# Patient Record
Sex: MALE | Race: WHITE | URBAN - METROPOLITAN AREA
[De-identification: names, ages, dates, MRNs, and addresses within clinical notes are randomized per-mention and may not be internally consistent; named-entity substitution may affect disease eponyms.]

---

## 2017-12-13 ENCOUNTER — HOSPITAL ENCOUNTER (OUTPATIENT)
Dept: SURGERY | Age: 22
Discharge: HOME | End: 2017-12-13
Attending: ANESTHESIOLOGY | Admitting: ANESTHESIOLOGY

## 2017-12-13 VITALS
DIASTOLIC BLOOD PRESSURE: 60 MMHG | SYSTOLIC BLOOD PRESSURE: 114 MMHG | HEART RATE: 99 BPM | OXYGEN SATURATION: 99 % | TEMPERATURE: 98 F | RESPIRATION RATE: 16 BRPM

## 2017-12-13 LAB
GLUCOSE BLD-MCNC: 75 MG/DL (ref 70–99)
GLUCOSE BLD-MCNC: 81 MG/DL (ref 70–99)
PERFORMED ON: NORMAL
PERFORMED ON: NORMAL

## 2017-12-13 RX ORDER — LIDOCAINE HYDROCHLORIDE 10 MG/ML
1 INJECTION, SOLUTION EPIDURAL; INFILTRATION; INTRACAUDAL; PERINEURAL
Status: COMPLETED | OUTPATIENT
Start: 2017-12-13 | End: 2017-12-13

## 2017-12-13 RX ORDER — ONDANSETRON 2 MG/ML
4 INJECTION INTRAMUSCULAR; INTRAVENOUS
Status: ACTIVE | OUTPATIENT
Start: 2017-12-13 | End: 2017-12-13

## 2017-12-13 RX ORDER — HYDRALAZINE HYDROCHLORIDE 20 MG/ML
5 INJECTION INTRAMUSCULAR; INTRAVENOUS
Status: DISCONTINUED | OUTPATIENT
Start: 2017-12-13 | End: 2017-12-14 | Stop reason: HOSPADM

## 2017-12-13 RX ORDER — MEPERIDINE HYDROCHLORIDE 25 MG/ML
12.5 INJECTION INTRAMUSCULAR; INTRAVENOUS; SUBCUTANEOUS EVERY 5 MIN PRN
Status: DISCONTINUED | OUTPATIENT
Start: 2017-12-13 | End: 2017-12-14 | Stop reason: HOSPADM

## 2017-12-13 RX ORDER — SODIUM CHLORIDE 0.9 % (FLUSH) 0.9 %
10 SYRINGE (ML) INJECTION EVERY 12 HOURS SCHEDULED
Status: DISCONTINUED | OUTPATIENT
Start: 2017-12-13 | End: 2017-12-14 | Stop reason: HOSPADM

## 2017-12-13 RX ORDER — MORPHINE SULFATE 10 MG/ML
1 INJECTION, SOLUTION INTRAMUSCULAR; INTRAVENOUS EVERY 5 MIN PRN
Status: DISCONTINUED | OUTPATIENT
Start: 2017-12-13 | End: 2017-12-14 | Stop reason: HOSPADM

## 2017-12-13 RX ORDER — HYDROMORPHONE HCL 110MG/55ML
0.25 PATIENT CONTROLLED ANALGESIA SYRINGE INTRAVENOUS EVERY 5 MIN PRN
Status: DISCONTINUED | OUTPATIENT
Start: 2017-12-13 | End: 2017-12-14 | Stop reason: HOSPADM

## 2017-12-13 RX ORDER — OXYCODONE HYDROCHLORIDE AND ACETAMINOPHEN 5; 325 MG/1; MG/1
2 TABLET ORAL PRN
Status: COMPLETED | OUTPATIENT
Start: 2017-12-13 | End: 2017-12-13

## 2017-12-13 RX ORDER — MORPHINE SULFATE 10 MG/ML
2 INJECTION, SOLUTION INTRAMUSCULAR; INTRAVENOUS EVERY 5 MIN PRN
Status: DISCONTINUED | OUTPATIENT
Start: 2017-12-13 | End: 2017-12-14 | Stop reason: HOSPADM

## 2017-12-13 RX ORDER — OXYCODONE HYDROCHLORIDE AND ACETAMINOPHEN 5; 325 MG/1; MG/1
1 TABLET ORAL PRN
Status: COMPLETED | OUTPATIENT
Start: 2017-12-13 | End: 2017-12-13

## 2017-12-13 RX ORDER — OXYCODONE HYDROCHLORIDE 10 MG/1
10-20 TABLET ORAL EVERY 4 HOURS PRN
Qty: 60 TABLET | Refills: 0 | Status: SHIPPED | OUTPATIENT
Start: 2017-12-13 | End: 2017-12-20

## 2017-12-13 RX ORDER — SODIUM CHLORIDE 0.9 % (FLUSH) 0.9 %
10 SYRINGE (ML) INJECTION PRN
Status: DISCONTINUED | OUTPATIENT
Start: 2017-12-13 | End: 2017-12-14 | Stop reason: HOSPADM

## 2017-12-13 RX ORDER — HYDROMORPHONE HCL 110MG/55ML
0.5 PATIENT CONTROLLED ANALGESIA SYRINGE INTRAVENOUS EVERY 5 MIN PRN
Status: DISCONTINUED | OUTPATIENT
Start: 2017-12-13 | End: 2017-12-14 | Stop reason: HOSPADM

## 2017-12-13 RX ORDER — LABETALOL HYDROCHLORIDE 5 MG/ML
5 INJECTION, SOLUTION INTRAVENOUS EVERY 10 MIN PRN
Status: DISCONTINUED | OUTPATIENT
Start: 2017-12-13 | End: 2017-12-14 | Stop reason: HOSPADM

## 2017-12-13 RX ORDER — SODIUM CHLORIDE, SODIUM LACTATE, POTASSIUM CHLORIDE, CALCIUM CHLORIDE 600; 310; 30; 20 MG/100ML; MG/100ML; MG/100ML; MG/100ML
INJECTION, SOLUTION INTRAVENOUS CONTINUOUS
Status: DISCONTINUED | OUTPATIENT
Start: 2017-12-13 | End: 2017-12-14 | Stop reason: HOSPADM

## 2017-12-13 RX ADMIN — SODIUM CHLORIDE, SODIUM LACTATE, POTASSIUM CHLORIDE, CALCIUM CHLORIDE: 600; 310; 30; 20 INJECTION, SOLUTION INTRAVENOUS at 06:29

## 2017-12-13 RX ADMIN — LIDOCAINE HYDROCHLORIDE 0.1 ML: 10 INJECTION, SOLUTION EPIDURAL; INFILTRATION; INTRACAUDAL; PERINEURAL at 06:28

## 2017-12-13 RX ADMIN — OXYCODONE HYDROCHLORIDE AND ACETAMINOPHEN 1 TABLET: 5; 325 TABLET ORAL at 09:02

## 2017-12-13 RX ADMIN — Medication 0.5 MG: at 09:01

## 2017-12-13 ASSESSMENT — PAIN SCALES - GENERAL
PAINLEVEL_OUTOF10: 7
PAINLEVEL_OUTOF10: 0
PAINLEVEL_OUTOF10: 7
PAINLEVEL_OUTOF10: 7
PAINLEVEL_OUTOF10: 0
PAINLEVEL_OUTOF10: 0

## 2017-12-13 ASSESSMENT — PAIN - FUNCTIONAL ASSESSMENT: PAIN_FUNCTIONAL_ASSESSMENT: 0-10

## 2017-12-13 ASSESSMENT — PAIN DESCRIPTION - LOCATION: LOCATION: BACK

## 2017-12-13 ASSESSMENT — PAIN DESCRIPTION - DESCRIPTORS: DESCRIPTORS: ACHING;SHARP;CONSTANT

## 2017-12-13 NOTE — ANESTHESIA PRE-OP
Department of Anesthesiology  Preprocedure Note       Name:  Puja Cervantes   Age:  23 y. o.  :  1995                                          MRN:  [id number]         Date:  2017      Surgeon:    Procedure:    Medications prior to admission:   Prior to Admission medications    Medication Sig Start Date End Date Taking? Authorizing Provider   methylphenidate (RITALIN) 10 MG tablet Take 10 mg by mouth 2 times daily .    Yes Historical Provider, MD   topiramate (TOPAMAX SPRINKLE) 25 MG capsule Take 25 mg by mouth 2 times daily   Yes Historical Provider, MD   hydrochlorothiazide (MICROZIDE) 12.5 MG capsule TAKE 1 CAPSULE BY MOUTH DAILY 17  Yes Earl Lara MD   fexofenadine (ALLEGRA) 180 MG tablet Take 180 mg by mouth daily   Yes Historical Provider, MD   ferrous sulfate 325 (65 FE) MG tablet Take 325 mg by mouth daily (with breakfast)   Yes Historical Provider, MD   lubiprostone (AMITIZA) 24 MCG capsule Take 24 mcg by mouth 2 times daily (with meals)   Yes Historical Provider, MD   ARIPiprazole (ABILIFY) 10 MG tablet Take 10 mg by mouth daily   Yes Historical Provider, MD   traZODone (DESYREL) 50 MG tablet Take 50 mg by mouth nightly   Yes Historical Provider, MD   ranitidine (ZANTAC) 300 MG tablet Take 300 mg by mouth nightly   Yes Historical Provider, MD   ezetimibe (ZETIA) 10 MG tablet Take 10 mg by mouth nightly   Yes Historical Provider, MD   FLUoxetine (PROZAC) 20 MG capsule Take 20 mg by mouth daily Indications: 3 caps in am   Yes Historical Provider, MD   divalproex (DEPAKOTE) 500 MG DR tablet Take 500 mg by mouth daily   Yes Historical Provider, MD   levothyroxine (SYNTHROID) 137 MCG tablet Take 137 mcg by mouth Daily   Yes Historical Provider, MD   vitamin D 1000 UNITS CAPS Take by mouth every morning   Yes Historical Provider, MD   Saxagliptin-Metformin ER 5-1000 MG TB24 Take by mouth daily   Yes Historical Provider, MD   omeprazole (PRILOSEC) 20 MG capsule Take 40 mg by mouth daily Yes Historical Provider, MD   desmopressin (DDAVP) 0.2 MG tablet Take 0.2 mg by mouth daily   Yes Historical Provider, MD   metoprolol (LOPRESSOR) 50 MG tablet Take 50 mg by mouth 2 times daily   Yes Historical Provider, MD   risperiDONE (RISPERDAL) 1 MG tablet Take 1 mg by mouth 2 times daily   Yes Historical Provider, MD   rosuvastatin (CRESTOR) 10 MG tablet Take 20 mg by mouth daily    Yes Historical Provider, MD   busPIRone (BUSPAR) 10 MG tablet Take 40 mg by mouth daily    Historical Provider, MD   morphine (MSIR) 15 MG tablet Take 15 mg by mouth every 12 hours as needed for Pain    Historical Provider, MD   cycloSPORINE (RESTASIS) 0.05 % ophthalmic emulsion 1 drop 2 times daily    Historical Provider, MD       Current medications:    Current Outpatient Prescriptions   Medication Sig Dispense Refill    methylphenidate (RITALIN) 10 MG tablet Take 10 mg by mouth 2 times daily .       topiramate (TOPAMAX SPRINKLE) 25 MG capsule Take 25 mg by mouth 2 times daily      hydrochlorothiazide (MICROZIDE) 12.5 MG capsule TAKE 1 CAPSULE BY MOUTH DAILY 30 capsule 4    fexofenadine (ALLEGRA) 180 MG tablet Take 180 mg by mouth daily      ferrous sulfate 325 (65 FE) MG tablet Take 325 mg by mouth daily (with breakfast)      lubiprostone (AMITIZA) 24 MCG capsule Take 24 mcg by mouth 2 times daily (with meals)      ARIPiprazole (ABILIFY) 10 MG tablet Take 10 mg by mouth daily      traZODone (DESYREL) 50 MG tablet Take 50 mg by mouth nightly      ranitidine (ZANTAC) 300 MG tablet Take 300 mg by mouth nightly      ezetimibe (ZETIA) 10 MG tablet Take 10 mg by mouth nightly      FLUoxetine (PROZAC) 20 MG capsule Take 20 mg by mouth daily Indications: 3 caps in am      divalproex (DEPAKOTE) 500 MG DR tablet Take 500 mg by mouth daily      levothyroxine (SYNTHROID) 137 MCG tablet Take 137 mcg by mouth Daily      vitamin D 1000 UNITS CAPS Take by mouth every morning      Saxagliptin-Metformin ER 5-1000 MG TB24 Take by mouth daily      omeprazole (PRILOSEC) 20 MG capsule Take 40 mg by mouth daily      desmopressin (DDAVP) 0.2 MG tablet Take 0.2 mg by mouth daily      metoprolol (LOPRESSOR) 50 MG tablet Take 50 mg by mouth 2 times daily      risperiDONE (RISPERDAL) 1 MG tablet Take 1 mg by mouth 2 times daily      rosuvastatin (CRESTOR) 10 MG tablet Take 20 mg by mouth daily       busPIRone (BUSPAR) 10 MG tablet Take 40 mg by mouth daily      morphine (MSIR) 15 MG tablet Take 15 mg by mouth every 12 hours as needed for Pain      cycloSPORINE (RESTASIS) 0.05 % ophthalmic emulsion 1 drop 2 times daily       Current Facility-Administered Medications   Medication Dose Route Frequency Provider Last Rate Last Dose    lactated ringers infusion   Intravenous Continuous Renetta Ryan  mL/hr at 12/13/17 0802      sodium chloride flush 0.9 % injection 10 mL  10 mL Intravenous 2 times per day Renetta Ryan MD        sodium chloride flush 0.9 % injection 10 mL  10 mL Intravenous PRN Renetta Ryan MD        ceFAZolin (ANCEF) 2 g in dextrose 3 % 50 mL IVPB (duplex)  2 g Intravenous Once Violeta Amaya  mL/hr at 12/13/17 0657 2 g at 12/13/17 0657    vancomycin (VANCOCIN) 1000 MG injection             HYDROmorphone (DILAUDID) injection 0.25 mg  0.25 mg Intravenous Q5 Min PRN Renetta Ryan MD        HYDROmorphone (DILAUDID) injection 0.5 mg  0.5 mg Intravenous Q5 Min PRN Renetta Ryan MD        morphine (PF) injection 1 mg  1 mg Intravenous Q5 Min PRN Renetta Ryan MD        morphine (PF) injection 2 mg  2 mg Intravenous Q5 Min PRN Renetta Ryan MD        oxyCODONE-acetaminophen (PERCOCET) 5-325 MG per tablet 1 tablet  1 tablet Oral PRN Renetta Ryan MD        Or    oxyCODONE-acetaminophen (PERCOCET) 5-325 MG per tablet 2 tablet  2 tablet Oral PRN Renetta Ryan MD        [facility] injection 4 mg  4 mg

## 2017-12-13 NOTE — PROGRESS NOTES
Pre-Operative:  1. Patient/Caregiver identifies - states name and date of birth. 2. The patient is free from signs and symptoms of injury. 3. The patient receives appropriate medication(s), safely administered during the Perioperative period. 4. The patient is free from signs and symptoms of infection. 5. The patient has wound / tissue perfusion. 6. The patients's fluid, electrolyte, and acid-base balances are established preoperatively. 7. The patient's pulmonary function is established preoperatively. 8. The patient's cardiovascular status is established preoperatively. 9. The patient / caregiver demonstrates knowledge of nutritional management related to the operative or other invasive procedure. 10. The patient/caregiver demonstrates knowledge of medication management. 11. The patient/caregiver demonstrates knowledge of pain management. 12. The patient participates in the rehabilitation process as applicable. 13. The patient/caregiver participates in decisions affection his or her Perioperative plan of care. 14. The patient's care is consistent with the individualized Perioperative plan of care. 15. The patient's right to privacy is maintained. 16. The patient is the recipient of competent and ethical care within legal standards of practice. 17. The patient's value system, lifestyle, ethnicity, and culture are considered, respected, and incorporated in the Perioperative plan of care and understands special services available. 18. The patient demonstrates and/or reports adequate pain control throughout the the Perioperative period. 19. The patient's neurological status is established preoperatively. 20. The patient/caregiver demonstrates knowledge of the expected responses to the operative or invasive procedure. 21. Patient/Caregiver has reduced anxiety. Interventions- Familiarize with environment and equipment.   22. Patient/Caregiver verbalizes understanding of Phase I and Phase II process. 23. Patient pain level is established preoperatively using age appropriate pain scale. 24. The patient will move to fall risk upon sedation- during and through the recovery phase. [place] to environment. Call light in reach. Instruct to call for assistance prior to getting up. Non skid footwear. Bed wheels locked. Bed in lowest position. Siderails up times two. Family at chairside/bedside preoperatively to assist with increased observation of patient.

## 2017-12-13 NOTE — PROGRESS NOTES
Phase II:  1. Patient is identified using name and the date of birth. 2. The patient is free from signs and symptoms of chemical, electrical, laser, radiation, positioning, or transfer/transport injury. 3. The patient receives appropriate medication(s), safely administered during the Perioperative period. 4. The patient has wound/tissue perfusion consistent with or improved from baseline levels established preoperatively. 5. The patient is at or returning to normothermia at the conclusion of the immediate postoperative period. 6. The patient's fluid, electrolyte, and acid base balances are consistent with or improved from baseline levels established preoperatively. 7. The patient's pulmonary function is consistent with or improved from baseline levels established preoperatively. 8. The patient's cardiovascular status is consistent with or improved from baseline levels established preoperatively. 9. The patient/caregiver demonstrates knowledge of nutritional management related to the operative or other invasive procedure. 10. The patient/caregiver demonstrates knowledge of medication, pain, and wound management. 11. The patient participates in the rehabilitation process as applicable. 12. The patient/caregiver participates in decisions affection his or her Perioperative plan of care. 13. The patient's care is consistent with the individualized Perioperative plan of care. 14. The patient's right to privacy is maintained. 15. The patient is the recipient of competent and ethical care within legal standards of practice. 16. The patient's value system, lifestyle, ethnicity, and culture are considered, respected, and incorporated in the Perioperative plan of care and understands special services available. 17. The patient demonstrates and/or reports adequate pain control throughout the the Perioperative period. 18.  The patient's neurological status is consistent with or improved from baseline levels established preoperatively. 23. The patient/caregiver demonstrates knowledge of the expected responses to the operative or invasive procedure. 20. Patient/Caregiver has reduced anxiety. Interventions- Familiarize with environment and equipment. 21. Potential for fall the patient will move to fall risk upon sedation through the recovery phase. [place] to environment. Call light in reach. Instruct to call for assistance prior to getting up. Non skid footwear on. Bed wheels locked. Bed in lowest position. Side rails up times two.

## 2017-12-13 NOTE — PROGRESS NOTES
Phase I (PACU)  1. Patient is identified using name and the date of birth. 2. The patient is free from signs and symptoms of chemical, electrical, laser, radiation, positioning, or transfer/transport injury. 3. The patient receives appropriate medication(s), safely administered during the Perioperative period. 4. The patient has wound/tissue perfusion consistent with or improved from baseline levels established preoperatively. 5. The patient is at or returning to normothermia at the conclusion of the immediate postoperative period. 6. The patient's fluid, electrolyte, and acid base balances are consistent with or improved from baseline levels established preoperatively. 7. The patient's pulmonary function is consistent with or improved from baseline levels established preoperatively. 8. The patient's cardiovascular status is consistent with or improved from baseline levels established preoperatively. 9. The patient/caregiver participates in decisions affecting his or her Perioperative plan of care. 10. The patient's care is consistent with the individualized Perioperative plan of care. 11. The patient's right to privacy is maintained. 12. The patient is the recipient of competent and ethical care within legal standards of practice. 13. The patient's value system, lifestyle, ethnicity, and culture are considered, respected, and incorporated in the Perioperative plan of care. 14. The patient demonstrates and/or reports adequate pain control throughout the the Perioperative period. 15. The patient's neurological status is consistent with or improved from baseline levels established preoperatively. 16. The patient/caregiver demonstrates knowledge of the expected responses to the operative or invasive procedure. 17. Patient/Caregiver has reduced anxiety. Interventions- Familiarize with environment and equipment.   18. Potential for fall the patient will move to fall risk upon sedation - through the

## 2017-12-13 NOTE — OP NOTE
horizontal incision was performed with an #11 scalpel. The pocket was created using the Bovie and blunt dissection. After confirming appropriate pocket size, 0-[place] anchor stitches were placed in the pocket. Then the tunneler was used to create a track through the subcutaneous tissue from the lumbar incision to the pocket incision and the catheter was placed through the tunneler into the pocket. CSF flow was again confirmed. Both incisions were copiously irrigated with saline and Vancomycin powder was placed in both incisions. The connector was placed on the catheter and was connected to the pump. The catheter port was aspirated to confirm CSF flow. Then the pump was placed in the pocket with the catheter port oriented towards the head and the anchor stitches were tied down. Then, the midline incision and the pocket were closed with 2-0 Vicryl suture in 2 layers and the skin for both incisions was closed with staples. The skin was cleaned and sterile dressings with gauze with Bacitracin and Tegaderm were applied to both incisions. The patient was then transferred to the [place] and taken to the recovery room. Medication Used: Lidocaine 1% and Bupivacaine 0.25% in equal mixture for anesthetizing the skin and deeper tissues. Vancomycin powder 1 gram was placed in the pocket. Estimated Blood Loss: less than 50 ml    Fluids: See anesthesia record. Assessment: The surgery was well tolerated and there were no apparent complications. The patient was taken to the recovery room in stable condition. Discharge Plan: Return in 7-10 days for a follow-up visit and for removal of the staples. Call physician and go to the emergency room with any signs of postoperative complications. Patient was given a separate prescription for postoperative pain relief. Disposition: The patient was discharged home with escort.       Fluoroscopy Time: 43 sec                 Germán Zamora MD

## 2017-12-13 NOTE — PROGRESS NOTES
Assessment unchanged from previous. Verbalizes understanding of discharge instructions and written instructions also given to patient. Questions and concerns addressed at this time. Denies any needs at this time. IV d/c'd. Pt discharged via wheelchair to car in good condition.